# Patient Record
Sex: MALE | Race: WHITE | NOT HISPANIC OR LATINO | Employment: FULL TIME | RURAL
[De-identification: names, ages, dates, MRNs, and addresses within clinical notes are randomized per-mention and may not be internally consistent; named-entity substitution may affect disease eponyms.]

---

## 2021-08-03 ENCOUNTER — OFFICE VISIT (OUTPATIENT)
Dept: FAMILY MEDICINE CLINIC | Facility: CLINIC | Age: 31
End: 2021-08-03

## 2021-08-03 VITALS
SYSTOLIC BLOOD PRESSURE: 124 MMHG | BODY MASS INDEX: 32.93 KG/M2 | DIASTOLIC BLOOD PRESSURE: 80 MMHG | RESPIRATION RATE: 18 BRPM | HEIGHT: 70 IN | WEIGHT: 230 LBS | TEMPERATURE: 96.8 F | HEART RATE: 74 BPM | OXYGEN SATURATION: 98 %

## 2021-08-03 DIAGNOSIS — Z00.00 ANNUAL PHYSICAL EXAM: Primary | ICD-10-CM

## 2021-08-03 DIAGNOSIS — R53.81 MALAISE AND FATIGUE: ICD-10-CM

## 2021-08-03 DIAGNOSIS — U09.9 PERSISTENT FATIGUE AFTER COVID-19: ICD-10-CM

## 2021-08-03 DIAGNOSIS — E66.09 CLASS 1 OBESITY DUE TO EXCESS CALORIES WITHOUT SERIOUS COMORBIDITY WITH BODY MASS INDEX (BMI) OF 33.0 TO 33.9 IN ADULT: ICD-10-CM

## 2021-08-03 DIAGNOSIS — R53.83 PERSISTENT FATIGUE AFTER COVID-19: ICD-10-CM

## 2021-08-03 DIAGNOSIS — Z13.220 SCREENING FOR HYPERLIPIDEMIA: ICD-10-CM

## 2021-08-03 DIAGNOSIS — R53.83 MALAISE AND FATIGUE: ICD-10-CM

## 2021-08-03 LAB
BILIRUB BLD-MCNC: NEGATIVE MG/DL
CLARITY, POC: CLEAR
COLOR UR: YELLOW
GLUCOSE UR STRIP-MCNC: NEGATIVE MG/DL
KETONES UR QL: NEGATIVE
LEUKOCYTE EST, POC: NEGATIVE
NITRITE UR-MCNC: NEGATIVE MG/ML
PH UR: 5 [PH] (ref 5–8)
PROT UR STRIP-MCNC: NEGATIVE MG/DL
RBC # UR STRIP: NEGATIVE /UL
SP GR UR: 1.02 (ref 1–1.03)
UROBILINOGEN UR QL: NORMAL

## 2021-08-03 PROCEDURE — 81002 URINALYSIS NONAUTO W/O SCOPE: CPT | Performed by: FAMILY MEDICINE

## 2021-08-03 PROCEDURE — 99385 PREV VISIT NEW AGE 18-39: CPT | Performed by: FAMILY MEDICINE

## 2021-08-03 NOTE — PROGRESS NOTES
Subjective   Ag Charles is a 31 y.o. male.     Chief Complaint   Patient presents with   • Establish Care   • Fatigue       The patient is here: for coordination of medical care to discuss health maintenance and disease prevention to follow up on multiple medical problems.  Last comprehensive physical is unknown.    Overall has: moderate activity with work/home activities, good appetite, decreased in their activity level, feels well with minor complaints, decreased energy level and is sleeping well. Nutrition: eating a variety of foods. Last tetanus shot was less than 5 years ago.     Fatigue  This is a chronic problem. The current episode started more than 1 year ago. The problem occurs constantly. The problem has been gradually improving. Associated symptoms include fatigue. Pertinent negatives include no abdominal pain, chest pain, chills, diaphoresis, headaches or nausea. Nothing aggravates the symptoms. He has tried nothing for the symptoms. The treatment provided no relief.        Recent Hospitalizations:  No hospitalization(s) within the last year..  ccc      I personally reviewed and updated the patient's allergies, medications, problem list, and past medical, surgical, social, and family history. I have reviewed and confirmed the accuracy of the HPI and ROS as documented by the MA/LPN/RN True Pennington MD    Family History   Problem Relation Age of Onset   • No Known Problems Mother    • No Known Problems Father    • No Known Problems Sister    • No Known Problems Brother        Social History     Tobacco Use   • Smoking status: Never Smoker   • Smokeless tobacco: Current User     Types: Chew   Vaping Use   • Vaping Use: Never used   Substance Use Topics   • Alcohol use: Never   • Drug use: Never       History reviewed. No pertinent surgical history.    Patient Active Problem List   Diagnosis   • Class 1 obesity due to excess calories without serious comorbidity with body mass index (BMI) of 33.0 to  "33.9 in adult   • Persistent fatigue after COVID-19   • Annual physical exam       No current outpatient medications on file.    Review of Systems   Constitutional: Positive for fatigue. Negative for chills and diaphoresis.   HENT: Negative for trouble swallowing and voice change.    Eyes: Negative for visual disturbance.   Respiratory: Negative for shortness of breath.    Cardiovascular: Negative for chest pain and palpitations.   Gastrointestinal: Negative for abdominal pain and nausea.   Endocrine: Negative for polydipsia and polyphagia.   Genitourinary: Negative for hematuria.   Musculoskeletal: Negative for neck stiffness.   Skin: Negative for color change and pallor.   Allergic/Immunologic: Negative for immunocompromised state.   Neurological: Negative for seizures and syncope.   Hematological: Negative for adenopathy.   Psychiatric/Behavioral: Negative for hallucinations, sleep disturbance and suicidal ideas.       I have reviewed and confirmed the accuracy of the ROS as documented by the MA/LPN/RN True Pennington MD      Objective   /80   Pulse 74   Temp 96.8 °F (36 °C)   Resp 18   Ht 177.8 cm (70\")   Wt 104 kg (230 lb)   SpO2 98%   BMI 33.00 kg/m²   BP Readings from Last 3 Encounters:   08/03/21 124/80     Wt Readings from Last 3 Encounters:   08/03/21 104 kg (230 lb)     Physical Exam  Constitutional:       Appearance: He is well-developed. He is not diaphoretic.   HENT:      Head: Normocephalic.      Right Ear: Tympanic membrane, ear canal and external ear normal.      Left Ear: Tympanic membrane, ear canal and external ear normal.      Nose: Nose normal.   Eyes:      General: Lids are normal.      Conjunctiva/sclera: Conjunctivae normal.      Pupils: Pupils are equal, round, and reactive to light.   Neck:      Thyroid: No thyromegaly.      Vascular: No carotid bruit or JVD.      Trachea: No tracheal deviation.   Cardiovascular:      Rate and Rhythm: Normal rate and regular rhythm.      Heart " sounds: Normal heart sounds. No murmur heard.   No friction rub. No gallop.    Pulmonary:      Effort: Pulmonary effort is normal.      Breath sounds: Normal breath sounds. No stridor. No decreased breath sounds, wheezing or rales.   Abdominal:      General: Bowel sounds are normal. There is no distension.      Palpations: Abdomen is soft. There is no mass.      Tenderness: There is no abdominal tenderness. There is no guarding or rebound.      Hernia: No hernia is present.   Lymphadenopathy:      Head:      Right side of head: No submental, submandibular, tonsillar, preauricular, posterior auricular or occipital adenopathy.      Left side of head: No submental, submandibular, tonsillar, preauricular, posterior auricular or occipital adenopathy.      Cervical: No cervical adenopathy.   Skin:     General: Skin is warm and dry.      Coloration: Skin is not pale.   Neurological:      Mental Status: He is alert and oriented to person, place, and time.      Cranial Nerves: No cranial nerve deficit.      Sensory: No sensory deficit.      Coordination: Coordination normal.      Gait: Gait normal.      Deep Tendon Reflexes: Reflexes are normal and symmetric.         Data / Lab Results:    No results found for: HGBA1C  Lab Results   Component Value Date    GLU 81 08/04/2021     Lab Results   Component Value Date     (H) 08/04/2021     No results found for: CHOL  Lab Results   Component Value Date    TRIG 113 08/04/2021     Lab Results   Component Value Date    HDL 42 08/04/2021     No results found for: PSA  Lab Results   Component Value Date    WBC 5.9 08/04/2021    HGB 15.6 08/04/2021    HCT 45.7 08/04/2021    MCV 84 08/04/2021     08/04/2021     Lab Results   Component Value Date    TSH 2.920 08/04/2021      Lab Results   Component Value Date    BUN 14 08/04/2021    CREATININE 1.14 08/04/2021    EGFRIFNONA 86 08/04/2021    EGFRIFAFRI 99 08/04/2021    BCR 12 08/04/2021    K 4.2 08/04/2021    CO2 23 08/04/2021     CALCIUM 9.4 08/04/2021    PROTENTOTREF 7.3 08/04/2021    ALBUMIN 4.6 08/04/2021    LABIL2 1.7 08/04/2021    AST 25 08/04/2021    ALT 29 08/04/2021     No results found for: EUGENIA, RF, SEDRATE   No results found for: CRP   No results found for: IRON, TIBC, FERRITIN   No results found for: [id number]     Age-appropriate Screening Schedule:  Refer to the list below for future screening recommendations based on patient's age, sex and/or medical conditions. Orders for these recommended tests are listed in the plan section. The patient has been provided with a written plan.    Health Maintenance   Topic Date Due   • TDAP/TD VACCINES (1 - Tdap) Never done   • INFLUENZA VACCINE  10/01/2021           Assessment/Plan      Medications        Problem List         LOS    Physical.  Doing well.  Vaccines current.  Discussed health maintenance, screening test for lifestyle modification.  Screening lab scheduled.  Fatigue.  Post Covid/improving.  Check blood work.  Benign exam today.  Follow-up recheck.  Consider further eval if persistent symptoms.  Tobacco use.  Chews.  Encourage cessation.  See dentist regularly.      Diagnoses and all orders for this visit:    1. Annual physical exam (Primary)  -     POCT urinalysis dipstick, manual  -     CBC & Differential  -     Comprehensive Metabolic Panel  -     TSH  -     Lipid Panel With / Chol / HDL Ratio    2. Persistent fatigue after COVID-19    3. Class 1 obesity due to excess calories without serious comorbidity with body mass index (BMI) of 33.0 to 33.9 in adult    4. Screening for hyperlipidemia  -     Lipid Panel With / Chol / HDL Ratio    5. Malaise and fatigue  -     CBC & Differential  -     Comprehensive Metabolic Panel  -     TSH              Expected course, medications, and adverse effects discussed.  Call or return if worsening or persistent symptoms.  I wore protective equipment throughout this patient encounter including a mask, gloves, and eye protection.  Hand  hygiene was performed before donning protective equipment and after removal when leaving the room. The complete contents of the Assessment and Plan and Data / Lab Results as documented above have been reviewed and addressed by myself with the patient today as part of an ongoing evaluation / treatment plan.  If some of the documentation has been copied from a previous note and is unchanged it indicates that this problem / plan has been assessed today but is stable from a previous visit and no changes have been recommended.

## 2021-08-05 LAB
ALBUMIN SERPL-MCNC: 4.6 G/DL (ref 4.1–5.2)
ALBUMIN/GLOB SERPL: 1.7 {RATIO} (ref 1.2–2.2)
ALP SERPL-CCNC: 98 IU/L (ref 48–121)
ALT SERPL-CCNC: 29 IU/L (ref 0–44)
AST SERPL-CCNC: 25 IU/L (ref 0–40)
BASOPHILS # BLD AUTO: 0.1 X10E3/UL (ref 0–0.2)
BASOPHILS NFR BLD AUTO: 1 %
BILIRUB SERPL-MCNC: 0.5 MG/DL (ref 0–1.2)
BUN SERPL-MCNC: 14 MG/DL (ref 6–20)
BUN/CREAT SERPL: 12 (ref 9–20)
CALCIUM SERPL-MCNC: 9.4 MG/DL (ref 8.7–10.2)
CHLORIDE SERPL-SCNC: 102 MMOL/L (ref 96–106)
CHOLEST SERPL-MCNC: 212 MG/DL (ref 100–199)
CHOLEST/HDLC SERPL: 5 RATIO (ref 0–5)
CO2 SERPL-SCNC: 23 MMOL/L (ref 20–29)
CREAT SERPL-MCNC: 1.14 MG/DL (ref 0.76–1.27)
EOSINOPHIL # BLD AUTO: 0.2 X10E3/UL (ref 0–0.4)
EOSINOPHIL NFR BLD AUTO: 3 %
ERYTHROCYTE [DISTWIDTH] IN BLOOD BY AUTOMATED COUNT: 12.4 % (ref 11.6–15.4)
GLOBULIN SER CALC-MCNC: 2.7 G/DL (ref 1.5–4.5)
GLUCOSE SERPL-MCNC: 81 MG/DL (ref 65–99)
HCT VFR BLD AUTO: 45.7 % (ref 37.5–51)
HDLC SERPL-MCNC: 42 MG/DL
HGB BLD-MCNC: 15.6 G/DL (ref 13–17.7)
IMM GRANULOCYTES # BLD AUTO: 0 X10E3/UL (ref 0–0.1)
IMM GRANULOCYTES NFR BLD AUTO: 0 %
LDLC SERPL CALC-MCNC: 150 MG/DL (ref 0–99)
LYMPHOCYTES # BLD AUTO: 2.7 X10E3/UL (ref 0.7–3.1)
LYMPHOCYTES NFR BLD AUTO: 45 %
MCH RBC QN AUTO: 28.7 PG (ref 26.6–33)
MCHC RBC AUTO-ENTMCNC: 34.1 G/DL (ref 31.5–35.7)
MCV RBC AUTO: 84 FL (ref 79–97)
MONOCYTES # BLD AUTO: 0.5 X10E3/UL (ref 0.1–0.9)
MONOCYTES NFR BLD AUTO: 9 %
NEUTROPHILS # BLD AUTO: 2.5 X10E3/UL (ref 1.4–7)
NEUTROPHILS NFR BLD AUTO: 42 %
PLATELET # BLD AUTO: 251 X10E3/UL (ref 150–450)
POTASSIUM SERPL-SCNC: 4.2 MMOL/L (ref 3.5–5.2)
PROT SERPL-MCNC: 7.3 G/DL (ref 6–8.5)
RBC # BLD AUTO: 5.44 X10E6/UL (ref 4.14–5.8)
SODIUM SERPL-SCNC: 140 MMOL/L (ref 134–144)
TRIGL SERPL-MCNC: 113 MG/DL (ref 0–149)
TSH SERPL DL<=0.005 MIU/L-ACNC: 2.92 UIU/ML (ref 0.45–4.5)
VLDLC SERPL CALC-MCNC: 20 MG/DL (ref 5–40)
WBC # BLD AUTO: 5.9 X10E3/UL (ref 3.4–10.8)

## 2021-09-01 ENCOUNTER — TELEPHONE (OUTPATIENT)
Dept: FAMILY MEDICINE CLINIC | Facility: CLINIC | Age: 31
End: 2021-09-01

## 2021-09-01 NOTE — TELEPHONE ENCOUNTER
Spoke with patient and he is aware of results and recommendations. Requested mailed copy. This has been completed

## 2021-09-01 NOTE — TELEPHONE ENCOUNTER
----- Message from True Pennington MD sent at 8/31/2021  6:24 PM EDT -----  Let him know his blood work overall looks good, blood count, kidney look good.,  Thyroid all normal, his cholesterol is mild to moderately elevated, his LDL is 150, goal is less than 100, 1 working on diet and exercise, plan recheck labs in 1 year, thanks

## 2022-06-28 ENCOUNTER — TELEPHONE (OUTPATIENT)
Dept: FAMILY MEDICINE CLINIC | Facility: CLINIC | Age: 32
End: 2022-06-28

## 2022-06-28 NOTE — TELEPHONE ENCOUNTER
Caller: CONSTANTIN HILLMAN    Relationship: Emergency Contact    Best call back number:[phone]    Who is your current provider: TAMARA POSADA    Who would you like your new provider to be: SIMONA MAZARIEGOS    What are your reasons for transferring care: NOT COMFORTABLE WITH CURRENT PCP    Additional notes: